# Patient Record
Sex: MALE | Race: WHITE | NOT HISPANIC OR LATINO | Employment: STUDENT | URBAN - METROPOLITAN AREA
[De-identification: names, ages, dates, MRNs, and addresses within clinical notes are randomized per-mention and may not be internally consistent; named-entity substitution may affect disease eponyms.]

---
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
## 2020-09-09 ENCOUNTER — TRANSCRIBE ORDERS (OUTPATIENT)
Dept: ADMINISTRATIVE | Facility: HOSPITAL | Age: 20
End: 2020-09-09

## 2020-09-09 DIAGNOSIS — R68.81 EARLY SATIETY: Primary | ICD-10-CM

## 2020-09-27 ENCOUNTER — HOSPITAL ENCOUNTER (OUTPATIENT)
Dept: NUCLEAR MEDICINE | Facility: HOSPITAL | Age: 20
Discharge: HOME | End: 2020-09-27
Attending: INTERNAL MEDICINE
Payer: COMMERCIAL

## 2020-09-27 DIAGNOSIS — R68.81 EARLY SATIETY: ICD-10-CM

## 2020-09-27 PROCEDURE — A9541 TC99M SULFUR COLLOID: HCPCS

## 2020-09-27 PROCEDURE — G1004 CDSM NDSC: HCPCS

## 2020-09-27 PROCEDURE — 78264 GASTRIC EMPTYING IMG STUDY: CPT

## 2022-03-28 ENCOUNTER — OFFICE VISIT (OUTPATIENT)
Dept: DERMATOLOGY | Facility: CLINIC | Age: 22
End: 2022-03-28
Payer: COMMERCIAL

## 2022-03-28 VITALS — WEIGHT: 184.8 LBS | TEMPERATURE: 96.5 F | BODY MASS INDEX: 23.73 KG/M2

## 2022-03-28 DIAGNOSIS — Z13.89 SCREENING FOR SKIN CONDITION: ICD-10-CM

## 2022-03-28 DIAGNOSIS — D22.9 NEVUS: Primary | ICD-10-CM

## 2022-03-28 PROCEDURE — 99202 OFFICE O/P NEW SF 15 MIN: CPT | Performed by: DERMATOLOGY

## 2022-03-28 NOTE — PATIENT INSTRUCTIONS
Alea reviewed the concept of ABCDE and ugly duckling nothing markedly atypical patient reassured  Screening for skin condition nothing else of concern noted exam follow-up as needed

## 2022-03-28 NOTE — PROGRESS NOTES
[address]  [address] [facility]  [facility] [address]  [phone]  [phone]     MRN: [id number] : 2000  Encounter: [id number]  Patient Information: Davina Conley  Chief complaint:  Nevi    History of present illness:  25-year-old male presents for overall skin check concerned regarding moles was particularly concerned about 1 on his left chin that will that he does not like on the no other concerns noted  Past Medical History:   Diagnosis Date    H  pylori infection      Past Surgical History:   Procedure Laterality Date    TONSILLECTOMY AND ADENOIDECTOMY  2006    WISDOM TOOTH EXTRACTION       Social History   Social History     Substance and Sexual Activity   Alcohol Use No     Social History     Substance and Sexual Activity   Drug Use No     Social History     Tobacco Use   Smoking Status Never Smoker   Smokeless Tobacco Never Used     Family History   Family history unknown: Yes     Meds/Allergies   No Known Allergies    Meds:  Prior to Admission medications    Medication Sig Start Date End Date Taking?  Authorizing Provider   esomeprazole (NexIUM) 20 mg capsule Take 20 mg by mouth every morning before breakfast  Patient not taking: Reported on 3/28/2022     Historical Provider, MD   sucralfate (CARAFATE) 1 g tablet Take 1 tablet (1 g total) by mouth 4 (four) times a day 30 min prior to meals and at bedtime  Patient not taking: Reported on 2020   Jason Arriola MD       Subjective:     Review of Systems:    General: negative for - chills, fatigue, fever,  weight gain or weight loss  Psychological: negative for - anxiety, behavioral disorder, concentration difficulties, decreased libido, depression, irritability, memory difficulties, mood swings, sleep disturbances or suicidal ideation  ENT: negative for - hearing difficulties , nasal congestion, nasal discharge, oral lesions, sinus pain, sneezing, sore throat  Allergy and Immunology: negative for - hives, insect bite sensitivity,  Hematological and Lymphatic: negative for - bleeding problems, blood clots,bruising, swollen lymph nodes  Endocrine: negative for - hair pattern changes, hot flashes, malaise/lethargy, mood swings, palpitations, polydipsia/polyuria, skin changes, temperature intolerance or unexpected weight change  Respiratory: negative for - cough, hemoptysis, orthopnea, shortness of breath, or wheezing  Cardiovascular: negative for - chest pain, dyspnea on exertion, edema,  Gastrointestinal: negative for - abdominal pain, nausea/vomiting  Genito-Urinary: negative for - dysuria, incontinence, irregular/heavy menses or urinary frequency/urgency  Musculoskeletal: negative for - gait disturbance, joint pain, joint stiffness, joint swelling, muscle pain, muscular weakness  Dermatological:  As in HPI  Neurological: negative for confusion, dizziness, headaches, impaired coordination/balance, memory loss, numbness/tingling, seizures, speech problems, tremors or weakness       Objective:   Temp (!) 96 5 °F (35 8 °C) (Temporal)   Wt 83 8 kg (184 lb 12 8 oz)   BMI 23 73 kg/m²     Physical Exam:    General Appearance:    Alert, cooperative, no distress   Head:    Normocephalic, without obvious abnormality, atraumatic           Skin:   A full skin exam was performed including scalp, head scalp, eyes, ears, nose, lips, neck, chest, axilla, abdomen, back, buttocks, bilateral upper extremities, bilateral lower extremities, hands, feet, fingers, toes, fingernails, and toenails numerous pigmented lesions all regular shape and color nothing markedly atypical noted on complete exam 1 on chin is little bit more raised nothing else remarkable     Assessment:     1  Nevus     2   Screening for skin condition           Plan:   Nevi reviewed the concept of ABCDE and ugly duckling nothing markedly atypical patient reassured  Screening for skin condition nothing else of concern noted exam follow-up as needed    Isidra Barba MD  3/28/2022,3:03 PM    Portions of the record may have been created with voice recognition software   Occasional wrong word or "sound a like" substitutions may have occurred due to the inherent limitations of voice recognition software   Read the chart carefully and recognize, using context, where substitutions have occurred

## 2023-02-17 ENCOUNTER — OFFICE VISIT (OUTPATIENT)
Dept: FAMILY MEDICINE CLINIC | Facility: CLINIC | Age: 23
End: 2023-02-17

## 2023-02-17 VITALS
OXYGEN SATURATION: 97 % | SYSTOLIC BLOOD PRESSURE: 138 MMHG | RESPIRATION RATE: 18 BRPM | DIASTOLIC BLOOD PRESSURE: 82 MMHG | TEMPERATURE: 98.9 F | BODY MASS INDEX: 25.93 KG/M2 | HEIGHT: 74 IN | HEART RATE: 55 BPM | WEIGHT: 202 LBS

## 2023-02-17 DIAGNOSIS — Z00.00 ENCOUNTER FOR ROUTINE ADULT HEALTH EXAMINATION WITHOUT ABNORMAL FINDINGS: Primary | ICD-10-CM

## 2023-02-17 NOTE — PROGRESS NOTES
Name: Quincy Kilpatrick      : 2000      MRN: [id number]  Encounter Provider: ZHANNA Ireland  Encounter Date: 2023   Encounter department: [address] [place] [address]    Assessment & Plan     1  Encounter for routine adult health examination without abnormal findings  -     CBC and differential; Future  -     Comprehensive metabolic panel; Future  -     Lipid panel; Future  -     TSH, 3rd generation; Future  -     UA w Reflex to Microscopic w Reflex to Culture           Subjective       Patient is here for routine health physical   To have routine labs  To also discuss current state of health and any new problems that they may be experiencing  Review of Systems   Constitutional: Negative for appetite change and fever  HENT: Negative for congestion and trouble swallowing  Respiratory: Negative for shortness of breath  Cardiovascular: Negative for chest pain  Gastrointestinal: Negative for abdominal pain  Genitourinary: Negative for difficulty urinating  Musculoskeletal: Negative for myalgias  Neurological: Negative for dizziness  Psychiatric/Behavioral: The patient is not nervous/anxious          Past Medical History:   Diagnosis Date   • H  pylori infection      Past Surgical History:   Procedure Laterality Date   • TONSILLECTOMY AND ADENOIDECTOMY     • WISDOM TOOTH EXTRACTION       Family History   Problem Relation Age of Onset   • No Known Problems Mother    • No Known Problems Father      Social History     Socioeconomic History   • Marital status: Single     Spouse name: None   • Number of children: None   • Years of education: None   • Highest education level: None   Occupational History   • None   Tobacco Use   • Smoking status: Never   • Smokeless tobacco: Never   Vaping Use   • Vaping Use: Never used   Substance and Sexual Activity   • Alcohol use: No   • Drug use: No   • Sexual activity: None   Other Topics Concern   • None   Social History Narrative   • None     Social Determinants of Health     Financial Resource Strain: Not on file   Food Insecurity: Not on file   Transportation Needs: Not on file   Physical Activity: Not on file   Stress: Not on file   Social Connections: Not on file   Intimate Partner Violence: Not on file   Housing Stability: Not on file     Current Outpatient Medications on File Prior to Visit   Medication Sig   • esomeprazole (NexIUM) 20 mg capsule Take 20 mg by mouth every morning before breakfast (Patient not taking: Reported on 3/28/2022)   • sucralfate (CARAFATE) 1 g tablet Take 1 tablet (1 g total) by mouth 4 (four) times a day 30 min prior to meals and at bedtime (Patient not taking: Reported on 9/29/2020)     No Known Allergies  Immunization History   Administered Date(s) Administered   • DTaP 01/11/2001, 03/27/2001, 05/21/2001, 09/30/2002, 06/05/2006   • Hep B, Adolescent or Pediatric 08/27/2001, 12/20/2001, 09/30/2002   • HiB 01/11/2001, 03/27/2001, 05/21/2001, 03/20/2002   • IPV 06/05/2006   • MMR 04/16/2002, 06/05/2006   • Meningococcal MCV4P 08/23/2011, 07/27/2018   • OPV 01/11/2001, 03/27/2001, 09/30/2002   • Pneumococcal Conjugate PCV 7 01/11/2001, 03/27/2001, 05/21/2001, 03/20/2002   • Tdap 09/15/2012   • Varicella 04/16/2002, 08/23/2011       Objective     /82 (BP Location: Left arm, Patient Position: Sitting, Cuff Size: Standard)   Pulse 55   Temp 98 9 °F (37 2 °C) (Temporal)   Resp 18   Ht 6' 1 5" (1 867 m)   Wt 91 6 kg (202 lb)   SpO2 97%   BMI 26 29 kg/m²     Physical Exam  Vitals and nursing note reviewed  Constitutional:       General: He is not in acute distress  Appearance: He is well-developed  HENT:      Head: Normocephalic  Right Ear: External ear normal       Left Ear: External ear normal       Mouth/Throat:      Pharynx: Oropharynx is clear  Eyes:      Pupils: Pupils are equal, round, and reactive to light  Cardiovascular:      Rate and Rhythm: Normal rate and regular rhythm  Heart sounds: Normal heart sounds  Pulmonary:      Effort: Pulmonary effort is normal       Breath sounds: Normal breath sounds  Abdominal:      Palpations: Abdomen is soft  Musculoskeletal:         General: Normal range of motion  Cervical back: Normal range of motion  Skin:     General: Skin is warm and dry  Neurological:      Mental Status: He is alert and oriented to person, place, and time  Psychiatric:         Behavior: Behavior normal          Thought Content:  Thought content normal          Judgment: Judgment normal        HZANNA Nguyen

## 2023-03-11 LAB
ALBUMIN SERPL-MCNC: 5 G/DL (ref 3.6–5.1)
ALBUMIN/GLOB SERPL: 1.9 (CALC) (ref 1–2.5)
ALP SERPL-CCNC: 79 U/L (ref 36–130)
ALT SERPL-CCNC: 12 U/L (ref 9–46)
APPEARANCE UR: CLEAR
AST SERPL-CCNC: 15 U/L (ref 10–40)
BACTERIA UR QL AUTO: ABNORMAL /HPF
BASOPHILS # BLD AUTO: 29 CELLS/UL (ref 0–200)
BASOPHILS NFR BLD AUTO: 0.4 %
BILIRUB SERPL-MCNC: 1.3 MG/DL (ref 0.2–1.2)
BILIRUB UR QL STRIP: NEGATIVE
BUN SERPL-MCNC: 16 MG/DL (ref 7–25)
BUN/CREAT SERPL: ABNORMAL (CALC) (ref 6–22)
CALCIUM SERPL-MCNC: 10 MG/DL (ref 8.6–10.3)
CHLORIDE SERPL-SCNC: 103 MMOL/L (ref 98–110)
CHOLEST SERPL-MCNC: 156 MG/DL
CHOLEST/HDLC SERPL: 4 (CALC)
CO2 SERPL-SCNC: 28 MMOL/L (ref 20–32)
COLOR UR: YELLOW
CREAT SERPL-MCNC: 1.14 MG/DL (ref 0.6–1.24)
EOSINOPHIL # BLD AUTO: 80 CELLS/UL (ref 15–500)
EOSINOPHIL NFR BLD AUTO: 1.1 %
ERYTHROCYTE [DISTWIDTH] IN BLOOD BY AUTOMATED COUNT: 11.9 % (ref 11–15)
GFR/BSA.PRED SERPLBLD CYS-BASED-ARV: 93 ML/MIN/1.73M2
GLOBULIN SER CALC-MCNC: 2.6 G/DL (CALC) (ref 1.9–3.7)
GLUCOSE SERPL-MCNC: 82 MG/DL (ref 65–99)
GLUCOSE UR QL STRIP: NEGATIVE
HCT VFR BLD AUTO: 45.2 % (ref 38.5–50)
HDLC SERPL-MCNC: 39 MG/DL
HGB BLD-MCNC: 15.3 G/DL (ref 13.2–17.1)
HGB UR QL STRIP: NEGATIVE
HYALINE CASTS #/AREA URNS LPF: ABNORMAL /LPF
KETONES UR QL STRIP: NEGATIVE
LDLC SERPL CALC-MCNC: 99 MG/DL (CALC)
LEUKOCYTE ESTERASE UR QL STRIP: NEGATIVE
LYMPHOCYTES # BLD AUTO: 3000 CELLS/UL (ref 850–3900)
LYMPHOCYTES NFR BLD AUTO: 41.1 %
MCH RBC QN AUTO: 30.5 PG (ref 27–33)
MCHC RBC AUTO-ENTMCNC: 33.8 G/DL (ref 32–36)
MCV RBC AUTO: 90.2 FL (ref 80–100)
MONOCYTES # BLD AUTO: 694 CELLS/UL (ref 200–950)
MONOCYTES NFR BLD AUTO: 9.5 %
NEUTROPHILS # BLD AUTO: 3497 CELLS/UL (ref 1500–7800)
NEUTROPHILS NFR BLD AUTO: 47.9 %
NITRITE UR QL STRIP: NEGATIVE
NONHDLC SERPL-MCNC: 117 MG/DL (CALC)
PH UR STRIP: 5.5 [PH] (ref 5–8)
PLATELET # BLD AUTO: 298 THOUSAND/UL (ref 140–400)
PMV BLD REES-ECKER: 10.5 FL (ref 7.5–12.5)
POTASSIUM SERPL-SCNC: 4.4 MMOL/L (ref 3.5–5.3)
PROT SERPL-MCNC: 7.6 G/DL (ref 6.1–8.1)
PROT UR QL STRIP: ABNORMAL
RBC # BLD AUTO: 5.01 MILLION/UL (ref 4.2–5.8)
RBC #/AREA URNS HPF: ABNORMAL /HPF
SODIUM SERPL-SCNC: 138 MMOL/L (ref 135–146)
SP GR UR STRIP: 1.03 (ref 1–1.03)
SQUAMOUS #/AREA URNS HPF: ABNORMAL /HPF
TRIGL SERPL-MCNC: 86 MG/DL
TSH SERPL-ACNC: 2.67 MIU/L (ref 0.4–4.5)
WBC # BLD AUTO: 7.3 THOUSAND/UL (ref 3.8–10.8)
WBC #/AREA URNS HPF: ABNORMAL /HPF

## 2023-03-15 ENCOUNTER — OFFICE VISIT (OUTPATIENT)
Dept: FAMILY MEDICINE CLINIC | Facility: CLINIC | Age: 23
End: 2023-03-15

## 2023-03-15 VITALS
HEIGHT: 74 IN | BODY MASS INDEX: 25.15 KG/M2 | OXYGEN SATURATION: 98 % | TEMPERATURE: 98 F | HEART RATE: 67 BPM | DIASTOLIC BLOOD PRESSURE: 70 MMHG | WEIGHT: 196 LBS | SYSTOLIC BLOOD PRESSURE: 112 MMHG | RESPIRATION RATE: 18 BRPM

## 2023-03-15 DIAGNOSIS — Z72.821 POOR SLEEP HYGIENE: ICD-10-CM

## 2023-03-15 DIAGNOSIS — F12.90 MARIJUANA SMOKER: ICD-10-CM

## 2023-03-15 DIAGNOSIS — F41.9 ANXIETY: Primary | ICD-10-CM

## 2023-03-15 RX ORDER — BUSPIRONE HYDROCHLORIDE 7.5 MG/1
7.5 TABLET ORAL 2 TIMES DAILY
Qty: 60 TABLET | Refills: 5 | Status: SHIPPED | OUTPATIENT
Start: 2023-03-15

## 2023-03-15 NOTE — PROGRESS NOTES
Name: Yaimla Morales      : 2000      MRN: [id number]  Encounter Provider: ZHANNA Uribe  Encounter Date: 3/15/2023   Encounter department: [address] [place] [address]    Assessment & Plan     1  Anxiety  -     busPIRone (BUSPAR) 7 5 mg tablet; Take 1 tablet (7 5 mg total) by mouth 2 (two) times a day    2  Marijuana smoker    3  Poor sleep hygiene    Physical assessment unremarkable  Patient does not seem to from depression however anxiety possibly some slight paranoia possibly related to his marijuana use  Patient does state that he smokes to relieve anxiety is interested in stopping smoking and or reducing it significantly but he thinks that he will have a problem  Patient denies any thoughts of harming himself or harming anyone else  Patient also does state that he has very poor sleep hygiene multiple devices area in his room he does not feel he gets a good restful sleep  Did advise that this can also cause an increase in anxiety feelings of tiredness and fatigue  Ways to remediate this were discussed patient verbalized understanding and the desire to do this  Also I did advise that any medication or drug that he does use eventually that stopping cold turkey could render very poor results and outcomes  I did advise I will send BuSpar 7 5 mg to be taken 2 times daily to decrease his anxiety level in the meantime he can decrease his marijuana usage accordingly  Patient verbalized understanding did advise with the start of any new medication of this nature I would like to see him back in the office in 2 weeks for follow-up to determine how he is tolerating the medication and if it is providing the desired outcome  Again he was advised that he does not currently have thoughts of harming himself or anyone else but if he does develop those thoughts he is to contact me in the office immediately  Subjective     Patient is here with a continued complaint of anxiety    Patient states he feels it primarily in the morning upon wakening  It should be noted that patient does smoke marijuana multiple times per day  Has spoken to him in the past about the use of this med of drug and that it can cause some anxiety paranoia and various other physical symptoms  Patient states that he is ready to decrease the amount he does smoke but feels he would need something in place of it as he feels it would be to how much anxiety for him  He denies any other related symptoms  Review of Systems   Constitutional: Negative for appetite change and fever  HENT: Negative for congestion and trouble swallowing  Respiratory: Negative for shortness of breath  Cardiovascular: Negative for chest pain  Gastrointestinal: Negative for abdominal pain  Genitourinary: Negative for difficulty urinating  Musculoskeletal: Negative for myalgias  Neurological: Negative for dizziness  Psychiatric/Behavioral: The patient is nervous/anxious          Past Medical History:   Diagnosis Date   • H  pylori infection      Past Surgical History:   Procedure Laterality Date   • TONSILLECTOMY AND ADENOIDECTOMY  2006   • WISDOM TOOTH EXTRACTION       Family History   Problem Relation Age of Onset   • No Known Problems Mother    • No Known Problems Father      Social History     Socioeconomic History   • Marital status: Single     Spouse name: None   • Number of children: None   • Years of education: None   • Highest education level: None   Occupational History   • None   Tobacco Use   • Smoking status: Never   • Smokeless tobacco: Never   Vaping Use   • Vaping Use: Never used   Substance and Sexual Activity   • Alcohol use: No   • Drug use: No   • Sexual activity: None   Other Topics Concern   • None   Social History Narrative   • None     Social Determinants of Health     Financial Resource Strain: Not on file   Food Insecurity: Not on file   Transportation Needs: Not on file   Physical Activity: Not on file   Stress: Not on file   Social Connections: Not on file   Intimate Partner Violence: Not on file   Housing Stability: Not on file     Current Outpatient Medications on File Prior to Visit   Medication Sig   • esomeprazole (NexIUM) 20 mg capsule Take 20 mg by mouth every morning before breakfast (Patient not taking: Reported on 3/28/2022)   • sucralfate (CARAFATE) 1 g tablet Take 1 tablet (1 g total) by mouth 4 (four) times a day 30 min prior to meals and at bedtime (Patient not taking: Reported on 9/29/2020)     No Known Allergies  Immunization History   Administered Date(s) Administered   • DTaP 01/11/2001, 03/27/2001, 05/21/2001, 09/30/2002, 06/05/2006   • Hep B, Adolescent or Pediatric 08/27/2001, 12/20/2001, 09/30/2002   • HiB 01/11/2001, 03/27/2001, 05/21/2001, 03/20/2002   • IPV 06/05/2006   • MMR 04/16/2002, 06/05/2006   • Meningococcal MCV4P 08/23/2011, 07/27/2018   • OPV 01/11/2001, 03/27/2001, 09/30/2002   • Pneumococcal Conjugate PCV 7 01/11/2001, 03/27/2001, 05/21/2001, 03/20/2002   • Tdap 09/15/2012   • Varicella 04/16/2002, 08/23/2011       Objective     /70 (BP Location: Left arm, Patient Position: Sitting, Cuff Size: Large)   Pulse 67   Temp 98 °F (36 7 °C) (Temporal)   Resp 18   Ht 6' 1 5" (1 867 m)   Wt 88 9 kg (196 lb)   SpO2 98%   BMI 25 51 kg/m²     Physical Exam  Cardiovascular:      Rate and Rhythm: Regular rhythm  Heart sounds: Normal heart sounds  Pulmonary:      Effort: Pulmonary effort is normal    Neurological:      General: No focal deficit present  Mental Status: He is alert and oriented to person, place, and time     Psychiatric:         Mood and Affect: Mood normal          Behavior: Behavior normal        ZHANNA Santizo

## 2023-03-29 ENCOUNTER — OFFICE VISIT (OUTPATIENT)
Dept: FAMILY MEDICINE CLINIC | Facility: CLINIC | Age: 23
End: 2023-03-29

## 2023-03-29 VITALS
DIASTOLIC BLOOD PRESSURE: 70 MMHG | HEART RATE: 66 BPM | OXYGEN SATURATION: 97 % | SYSTOLIC BLOOD PRESSURE: 114 MMHG | HEIGHT: 74 IN | RESPIRATION RATE: 18 BRPM | WEIGHT: 193 LBS | BODY MASS INDEX: 24.77 KG/M2 | TEMPERATURE: 98.8 F

## 2023-03-29 DIAGNOSIS — F41.9 ANXIETY: Primary | ICD-10-CM

## 2023-03-29 NOTE — PROGRESS NOTES
"Name: Imelda Lindsay      : 2000      MRN: [id number]  Encounter Provider: ZHANNA Hickman  Encounter Date: 3/29/2023   Encounter department: [address] [place] [address]    Assessment & Plan     1  Anxiety    Physical assessment is unremarkable  Initial conversation patient is not sure if this is causing or giving him less anxiety  But and after discussion patient has realized that his sleep cycle has improved his better quality of sleep making better food choices has more interest in being out in about and also in his personal appearance  Patient states that his parents have noticed that he has better hygiene and is taking better care of himself physically no dark circles under his eyes looks well rested  Patient was informed of this and stated \"I think you might be right maybe it is helping\"  I did inform patient that this is a 2-week starting point and that he should feel significant improvement as the weeks past   Patient was also informed that if he gets a stagnant point that he can contact me by phone to discuss a possible increase in the dosage  Patient states he feels like this dosage is going to work well for him instructions were given how to refill medication he was also advised any changes in his mood or behavior he is to contact me back in the office immediately  He is happy with the outcome of today's visit  He would like to see me back in the office in 3 months  Dosing all possible side effects of the prescribed medications or medications that had been prescribed in the past were reviewed and all questions were answered  Patient verbalized agreement and understanding of the plan of care as outlined during the office visit today return to office as indicated or sooner if a problem arises  Subjective      Patient here for a 2-week follow-up after starting a new medication for anxiety BuSpar  He has taken it as prescribed    Patient states he is not sure if he " "is feeling less anxiety  But he has changed his sleep cycle he has also found the energy to work out  He feels he is not eating as much but making better food choices as well  He denies any thoughts of harming himself or harming anyone else  Review of Systems   Constitutional: Negative for appetite change and fever  HENT: Negative for congestion and trouble swallowing  Respiratory: Negative for shortness of breath  Cardiovascular: Negative for chest pain  Gastrointestinal: Negative for abdominal pain  Genitourinary: Negative for difficulty urinating  Musculoskeletal: Negative for myalgias  Neurological: Negative for dizziness  Psychiatric/Behavioral: The patient is nervous/anxious  Current Outpatient Medications on File Prior to Visit   Medication Sig   • busPIRone (BUSPAR) 7 5 mg tablet Take 1 tablet (7 5 mg total) by mouth 2 (two) times a day   • esomeprazole (NexIUM) 20 mg capsule Take 20 mg by mouth every morning before breakfast (Patient not taking: Reported on 3/28/2022)   • sucralfate (CARAFATE) 1 g tablet Take 1 tablet (1 g total) by mouth 4 (four) times a day 30 min prior to meals and at bedtime (Patient not taking: Reported on 9/29/2020)       Objective     /70 (BP Location: Left arm, Patient Position: Sitting, Cuff Size: Large)   Pulse 66   Temp 98 8 °F (37 1 °C) (Temporal)   Resp 18   Ht 6' 1 5\" (1 867 m)   Wt 87 5 kg (193 lb)   SpO2 97%   BMI 25 12 kg/m²     Physical Exam  Cardiovascular:      Rate and Rhythm: Regular rhythm  Heart sounds: Normal heart sounds  Pulmonary:      Breath sounds: Normal breath sounds  Neurological:      General: No focal deficit present  Mental Status: He is alert and oriented to person, place, and time  Psychiatric:         Behavior: Behavior normal          Thought Content:  Thought content normal          Judgment: Judgment normal       Comments: Patient appears to have become more rested calmer no dark circles " under his eyes         ZHANNA Arciniega

## 2023-06-29 ENCOUNTER — OFFICE VISIT (OUTPATIENT)
Dept: FAMILY MEDICINE CLINIC | Facility: CLINIC | Age: 23
End: 2023-06-29
Payer: COMMERCIAL

## 2023-06-29 VITALS
DIASTOLIC BLOOD PRESSURE: 80 MMHG | HEIGHT: 74 IN | WEIGHT: 200 LBS | BODY MASS INDEX: 25.67 KG/M2 | OXYGEN SATURATION: 97 % | HEART RATE: 54 BPM | TEMPERATURE: 98.1 F | SYSTOLIC BLOOD PRESSURE: 120 MMHG | RESPIRATION RATE: 18 BRPM

## 2023-06-29 DIAGNOSIS — F41.9 ANXIETY: ICD-10-CM

## 2023-06-29 PROCEDURE — 99213 OFFICE O/P EST LOW 20 MIN: CPT | Performed by: NURSE PRACTITIONER

## 2023-06-29 RX ORDER — BUSPIRONE HYDROCHLORIDE 7.5 MG/1
7.5 TABLET ORAL 2 TIMES DAILY
Qty: 60 TABLET | Refills: 5 | Status: SHIPPED | OUTPATIENT
Start: 2023-06-29

## 2023-06-29 NOTE — PROGRESS NOTES
Name: Dorisann Hodgkins      : 2000      MRN: [id number]  Encounter Provider: ZHANNA Moran  Encounter Date: 2023   Encounter department: [address] [place] [address]    Assessment & Plan     1  Anxiety  -     busPIRone (BUSPAR) 7 5 mg tablet; Take 1 tablet (7 5 mg total) by mouth 2 (two) times a day    Patient is here for follow-up  Patient states that he feels the BuSpar does a good job for him  He still struggling with using marijuana at this time but was definitely interested in stopping did advise if he has any questions or feels that he needs an increase in the BuSpar after stopping marijuana that he should contact me in the office will more than happy to do that  Also advised that illegal drug use is never a good idea  All of the questions answered he is happy with the outcome of today's visit like to see him back in the office in 6 months or sooner should he encounter any additional problems  Dosing all possible side effects of the prescribed medications or medications that had been prescribed in the past were reviewed and all questions were answered  Patient verbalized agreement and understanding of the plan of care as outlined during the office visit today return to office as indicated or sooner if a problem arises  Subjective        Patient is here for routine follow-up  To also discuss current state of health and any new problems that they may be experiencing  Patient states that medications taken as prescribed and very well tolerated no new complaints at this time  Review of Systems   Constitutional: Negative for appetite change and fever  HENT: Negative for congestion and trouble swallowing  Respiratory: Negative for shortness of breath  Cardiovascular: Negative for chest pain  Gastrointestinal: Negative for abdominal pain  Genitourinary: Negative for difficulty urinating  Musculoskeletal: Negative for myalgias     Neurological: Negative for "dizziness  Psychiatric/Behavioral: The patient is not nervous/anxious  Current Outpatient Medications on File Prior to Visit   Medication Sig   • [DISCONTINUED] busPIRone (BUSPAR) 7 5 mg tablet Take 1 tablet (7 5 mg total) by mouth 2 (two) times a day   • esomeprazole (NexIUM) 20 mg capsule Take 20 mg by mouth every morning before breakfast (Patient not taking: Reported on 3/28/2022)   • sucralfate (CARAFATE) 1 g tablet Take 1 tablet (1 g total) by mouth 4 (four) times a day 30 min prior to meals and at bedtime (Patient not taking: Reported on 9/29/2020)   • triamcinolone (KENALOG) 0 025 % cream Apply topically daily at bedtime as needed for irritation or rash (Patient not taking: Reported on 6/29/2023)       Objective     /80 (BP Location: Left arm, Patient Position: Sitting, Cuff Size: Large)   Pulse (!) 54   Temp 98 1 °F (36 7 °C) (Temporal)   Resp 18   Ht 6' 1 5\" (1 867 m)   Wt 90 7 kg (200 lb)   SpO2 97%   BMI 26 03 kg/m²     Physical Exam  Neurological:      General: No focal deficit present  Psychiatric:         Mood and Affect: Mood normal          Behavior: Behavior normal          Thought Content:  Thought content normal          Judgment: Judgment normal        ZHANNA Cantu  "

## 2023-09-14 ENCOUNTER — TELEPHONE (OUTPATIENT)
Age: 23
End: 2023-09-14

## 2023-09-14 NOTE — TELEPHONE ENCOUNTER
Yes if symptoms have reoccurred that he should definitely restart the medication. This is not a medication that can be taken on an as-needed basis has to be taken daily. If he has any questions concerning this we can have a follow-up visit in the office.

## 2023-09-14 NOTE — TELEPHONE ENCOUNTER
Call from patients mom advising the patient asked her to call and get some advice. Patient discussed coming off of his Buspar medication last time he was in the office. He started a new job and forgot to take the medication for 3 days and then just continued not to take it because he felt fine. Patient woke up this morning feeling anxious. Should he start to take the medication again? Please return her call.

## 2023-10-10 DIAGNOSIS — F41.9 ANXIETY: ICD-10-CM

## 2023-10-10 RX ORDER — BUSPIRONE HYDROCHLORIDE 7.5 MG/1
7.5 TABLET ORAL 2 TIMES DAILY
Qty: 60 TABLET | Refills: 5 | Status: CANCELLED | OUTPATIENT
Start: 2023-10-10

## 2023-10-10 NOTE — TELEPHONE ENCOUNTER
Renu Herrera from [facility] requested a 90 day supply of the Buspirone for patient   and needs instructions for usage. If you don't want to call it in you can escribe it. [facility] address is   [address] [place] [address], [address] [place] [address], [address] or fax [phone]        If you have questions please contact Renu Herrera  at [phone]      Thank you.

## 2023-10-11 DIAGNOSIS — F41.9 ANXIETY: Primary | ICD-10-CM

## 2023-10-11 RX ORDER — BUSPIRONE HYDROCHLORIDE 7.5 MG/1
7.5 TABLET ORAL 2 TIMES DAILY
Qty: 180 TABLET | Refills: 1 | Status: SHIPPED | OUTPATIENT
Start: 2023-10-11

## 2023-12-12 ENCOUNTER — OFFICE VISIT (OUTPATIENT)
Dept: FAMILY MEDICINE CLINIC | Facility: CLINIC | Age: 23
End: 2023-12-12
Payer: COMMERCIAL

## 2023-12-12 VITALS
RESPIRATION RATE: 16 BRPM | DIASTOLIC BLOOD PRESSURE: 82 MMHG | HEIGHT: 74 IN | HEART RATE: 78 BPM | TEMPERATURE: 98.2 F | BODY MASS INDEX: 24.77 KG/M2 | SYSTOLIC BLOOD PRESSURE: 120 MMHG | WEIGHT: 193 LBS | OXYGEN SATURATION: 96 %

## 2023-12-12 DIAGNOSIS — F41.9 ANXIETY: ICD-10-CM

## 2023-12-12 DIAGNOSIS — J06.9 URI WITH COUGH AND CONGESTION: Primary | ICD-10-CM

## 2023-12-12 PROCEDURE — 99214 OFFICE O/P EST MOD 30 MIN: CPT | Performed by: NURSE PRACTITIONER

## 2023-12-12 RX ORDER — AZITHROMYCIN 250 MG/1
TABLET, FILM COATED ORAL
Qty: 6 TABLET | Refills: 0 | Status: SHIPPED | OUTPATIENT
Start: 2023-12-12 | End: 2023-12-16

## 2023-12-12 RX ORDER — BUSPIRONE HYDROCHLORIDE 10 MG/1
10 TABLET ORAL 2 TIMES DAILY
Qty: 60 TABLET | Refills: 1 | Status: SHIPPED | OUTPATIENT
Start: 2023-12-12

## 2023-12-12 RX ORDER — KETOCONAZOLE 20 MG/G
CREAM TOPICAL
COMMUNITY
Start: 2023-11-01

## 2023-12-12 RX ORDER — PREDNISONE 10 MG/1
10 TABLET ORAL 2 TIMES DAILY WITH MEALS
Qty: 6 TABLET | Refills: 0 | Status: SHIPPED | OUTPATIENT
Start: 2023-12-12 | End: 2023-12-15

## 2023-12-12 NOTE — PROGRESS NOTES
Assessment/Plan:      Diagnoses and all orders for this visit:    URI with cough and congestion  -     predniSONE 10 mg tablet; Take 1 tablet (10 mg total) by mouth 2 (two) times a day with meals for 3 days  -     azithromycin (Zithromax) 250 mg tablet; Take 2 tablets (500 mg total) by mouth daily for 1 day, THEN 1 tablet (250 mg total) daily for 4 days. Is a new problem. Uncomplicated upper respiratory did advise we will send azithromycin and low-dose prednisone for 3 days to the pharmacy if fails to improve or significantly worsens he is to contact me back in the office. Dosing all possible side effects of the prescribed medications or medications that had been prescribed in the past were reviewed and all questions were answered. Patient verbalized agreement and understanding of the plan of care as outlined during the office visit today return to office as indicated or sooner if a problem arises. Anxiety  -     busPIRone (BUSPAR) 10 mg tablet; Take 1 tablet (10 mg total) by mouth 2 (two) times a day  Stable. I did advise we will increase the BuSpar to 10 mg twice daily is contacted back in the office if this level is working better for him I will increase the number that he is getting from the pharmacy. All of the questions were answered no thoughts of harming himself or harming anyone else. Subjective:     Patient ID: Ingrid Estes is a 21 y.o. male. Patient is here with a complaint of upper respiratory tract discomfort has had a cough runny nose and some nasal congestion. Denies any need nausea vomiting diarrhea chest pains or shortness of breath. All over-the-counter medication attempted arm were unsuccessful. Review of Systems   Constitutional:  Positive for fatigue. HENT:  Positive for congestion and postnasal drip. Respiratory:  Positive for cough and wheezing. Psychiatric/Behavioral:  The patient is nervous/anxious.           Objective:     Physical Exam  Vitals and nursing note reviewed. Constitutional:       General: He is not in acute distress. Appearance: He is well-developed. HENT:      Head: Normocephalic. Right Ear: External ear normal.      Left Ear: External ear normal.      Mouth/Throat:      Pharynx: Oropharynx is clear. Eyes:      Pupils: Pupils are equal, round, and reactive to light. Cardiovascular:      Rate and Rhythm: Normal rate and regular rhythm. Heart sounds: Normal heart sounds. Pulmonary:      Effort: Pulmonary effort is normal.      Breath sounds: Normal breath sounds. Abdominal:      Palpations: Abdomen is soft. Musculoskeletal:         General: Normal range of motion. Cervical back: Normal range of motion. Skin:     General: Skin is warm and dry. Neurological:      Mental Status: He is alert and oriented to person, place, and time. Psychiatric:         Behavior: Behavior normal.         Thought Content:  Thought content normal.         Judgment: Judgment normal.

## 2024-01-31 ENCOUNTER — OFFICE VISIT (OUTPATIENT)
Dept: FAMILY MEDICINE CLINIC | Facility: CLINIC | Age: 24
End: 2024-01-31
Payer: COMMERCIAL

## 2024-01-31 VITALS
DIASTOLIC BLOOD PRESSURE: 88 MMHG | TEMPERATURE: 98.8 F | BODY MASS INDEX: 25.54 KG/M2 | HEIGHT: 74 IN | HEART RATE: 77 BPM | SYSTOLIC BLOOD PRESSURE: 126 MMHG | RESPIRATION RATE: 16 BRPM | OXYGEN SATURATION: 98 % | WEIGHT: 199 LBS

## 2024-01-31 DIAGNOSIS — F41.8 ANXIETY WITH DEPRESSION: Primary | ICD-10-CM

## 2024-01-31 DIAGNOSIS — Z86.19 HISTORY OF COLD SORES: ICD-10-CM

## 2024-01-31 PROCEDURE — 99214 OFFICE O/P EST MOD 30 MIN: CPT | Performed by: NURSE PRACTITIONER

## 2024-01-31 RX ORDER — BUSPIRONE HYDROCHLORIDE 10 MG/1
10 TABLET ORAL 2 TIMES DAILY
Qty: 60 TABLET | Refills: 5 | Status: SHIPPED | OUTPATIENT
Start: 2024-01-31

## 2024-01-31 RX ORDER — VALACYCLOVIR HYDROCHLORIDE 500 MG/1
500 TABLET, FILM COATED ORAL 3 TIMES DAILY PRN
Qty: 30 TABLET | Refills: 2 | Status: SHIPPED | OUTPATIENT
Start: 2024-01-31 | End: 2024-02-03

## 2024-01-31 NOTE — PROGRESS NOTES
Assessment/Plan:      Diagnoses and all orders for this visit:    Anxiety with depression  -     busPIRone (BUSPAR) 10 mg tablet; Take 1 tablet (10 mg total) by mouth 2 (two) times a day  Tolerating the BuSpar up very well.  Anxiety and depression is very stable we will continue at current dose.  States it does make him slightly sleepy in the morning did advise setting alarm and taken for arising from bed otherwise continue medication as prescribed  History of cold sores  -     valACYclovir (Valtrex) 500 mg tablet; Take 1 tablet (500 mg total) by mouth 3 (three) times a day as needed (cold sore outbreak) for up to 3 days  Is a new problem.  Intermittent cold sores did advise Valtrex 500 mg 3 times daily when he feels a flare tingling also sun exposure: Abrasion.  Otherwise return to office as needed or as indicated.      Dosing all possible side effects of the prescribed medications or medications that had been prescribed in the past were reviewed and all questions were answered.  Patient verbalized agreement and understanding of the plan of care as outlined during the office visit today return to office as indicated or sooner if a problem arises.    Subjective:     Patient ID: Delmer Delarosa is a 23 y.o. male.      Patient is here for routine follow-up.  To review most recent labs.  To also discuss current state of health and any new problems that they may be experiencing.  Patient states that medications taken as prescribed and very well tolerated no new complaints at this time.            Review of Systems   Constitutional:  Negative for appetite change and fever.   HENT:  Negative for congestion and trouble swallowing.    Respiratory:  Negative for shortness of breath.    Cardiovascular:  Negative for chest pain.   Gastrointestinal:  Negative for abdominal pain.   Genitourinary:  Negative for difficulty urinating.   Musculoskeletal:  Negative for myalgias.   Neurological:  Negative for dizziness.    Psychiatric/Behavioral:  The patient is not nervous/anxious.          Objective:     Physical Exam  Constitutional:       Appearance: Normal appearance.   Cardiovascular:      Rate and Rhythm: Normal rate and regular rhythm.      Heart sounds: Normal heart sounds.   Pulmonary:      Effort: Pulmonary effort is normal.      Breath sounds: Normal breath sounds.   Neurological:      General: No focal deficit present.      Mental Status: He is alert and oriented to person, place, and time.

## 2024-02-01 ENCOUNTER — TELEMEDICINE (OUTPATIENT)
Dept: FAMILY MEDICINE CLINIC | Facility: CLINIC | Age: 24
End: 2024-02-01
Payer: COMMERCIAL

## 2024-02-01 DIAGNOSIS — J01.10 SUBACUTE FRONTAL SINUSITIS: Primary | ICD-10-CM

## 2024-02-01 PROCEDURE — 99441 PR PHYS/QHP TELEPHONE EVALUATION 5-10 MIN: CPT | Performed by: NURSE PRACTITIONER

## 2024-02-01 RX ORDER — AZITHROMYCIN 250 MG/1
TABLET, FILM COATED ORAL DAILY
Qty: 6 TABLET | Refills: 0 | Status: SHIPPED | OUTPATIENT
Start: 2024-02-01 | End: 2024-02-05

## 2024-02-01 NOTE — PROGRESS NOTES
Virtual Brief Visit    This Visit is being completed by telephone. The Patient is located at Home and in the following state in which I hold an active license [place]    The patient was identified by name and date of birth. Delmer Delarosa was informed that this is a telemedicine visit and that the visit is being conducted through Telephone.  My office door was closed. No one else was in the room.  He acknowledged consent and understanding of privacy and security of the video platform. The patient has agreed to participate and understands they can discontinue the visit at any time.    Patient is aware this is a billable service.       Assessment/Plan:    Problem List Items Addressed This Visit    None  Visit Diagnoses       Subacute frontal sinusitis    -  Primary    Relevant Medications    azithromycin (Zithromax) 250 mg tablet            Recent Visits  Date Type Provider Dept   01/31/24 Office Visit ZHANNA Barriga [facility]   Showing recent visits within past 7 days and meeting all other requirements  Future Appointments  No visits were found meeting these conditions.  Showing future appointments within next 150 days and meeting all other requirements         Visit Time  Total Visit Duration: 8 minutes

## 2024-03-26 ENCOUNTER — APPOINTMENT (OUTPATIENT)
Dept: URGENT CARE | Facility: MEDICAL CENTER | Age: 24
End: 2024-03-26

## 2024-06-07 ENCOUNTER — APPOINTMENT (OUTPATIENT)
Dept: URGENT CARE | Facility: MEDICAL CENTER | Age: 24
End: 2024-06-07
Payer: OTHER MISCELLANEOUS

## 2024-06-07 PROCEDURE — 99284 EMERGENCY DEPT VISIT MOD MDM: CPT | Performed by: PHYSICIAN ASSISTANT

## 2024-06-07 PROCEDURE — 90471 IMMUNIZATION ADMIN: CPT | Performed by: PHYSICIAN ASSISTANT

## 2024-06-07 PROCEDURE — 90715 TDAP VACCINE 7 YRS/> IM: CPT

## 2024-06-07 PROCEDURE — G0383 LEV 4 HOSP TYPE B ED VISIT: HCPCS | Performed by: PHYSICIAN ASSISTANT

## 2025-01-21 ENCOUNTER — TELEMEDICINE (OUTPATIENT)
Dept: FAMILY MEDICINE CLINIC | Facility: CLINIC | Age: 25
End: 2025-01-21
Payer: COMMERCIAL

## 2025-01-21 DIAGNOSIS — H00.012 HORDEOLUM EXTERNUM OF RIGHT LOWER EYELID: Primary | ICD-10-CM

## 2025-01-21 PROCEDURE — 99212 OFFICE O/P EST SF 10 MIN: CPT | Performed by: NURSE PRACTITIONER

## 2025-01-21 RX ORDER — ERYTHROMYCIN 5 MG/G
OINTMENT OPHTHALMIC
Qty: 3.5 G | Refills: 0 | Status: SHIPPED | OUTPATIENT
Start: 2025-01-21

## 2025-01-21 NOTE — PROGRESS NOTES
Virtual Regular Visit  Name: Delmer Delarosa      : 2000      MRN: [id number]  Encounter Provider: ZHANNA Dangelo  Encounter Date: 2025   Encounter department: [facility]      Verification of patient location:  Patient is located at Home in the following state in which I hold an active license [place] :  Assessment & Plan  Hordeolum externum of right lower eyelid    Orders:    erythromycin (ILOTYCIN) ophthalmic ointment; Administer 0.5 inches into the right eye daily at bedtime until resolved        Encounter provider ZHANNA Dangelo    The patient was identified by name and date of birth. Delmer Delarosa was informed that this is a telemedicine visit and that the visit is being conducted through the Epic Embedded platform. He agrees to proceed..  My office door was closed. No one else was in the room.  He acknowledged consent and understanding of privacy and security of the video platform. The patient has agreed to participate and understands they can discontinue the visit at any time.    Patient is aware this is a billable service.     History of Present Illness     Right eye discomfort feels like he might have a stye in the corner of his eye.  Denies any change in vision.    Review of Systems   Eyes:  Positive for redness and itching.       Objective   There were no vitals taken for this visit.    Physical Exam  Eyes:      General:         Right eye: Discharge present.       Visit Time  Total Visit Duration: 10 min

## 2025-03-26 ENCOUNTER — TELEMEDICINE (OUTPATIENT)
Dept: FAMILY MEDICINE CLINIC | Facility: CLINIC | Age: 25
End: 2025-03-26
Payer: COMMERCIAL

## 2025-03-26 DIAGNOSIS — Z72.51 HIGH RISK HETEROSEXUAL BEHAVIOR: Primary | ICD-10-CM

## 2025-03-26 PROCEDURE — 98012 SYNCH AUDIO-ONLY EST SF 10: CPT | Performed by: NURSE PRACTITIONER

## 2025-03-26 NOTE — PROGRESS NOTES
Virtual Brief Visit  Name: Delmer Delarosa      : 2000      MRN: [id number]  Encounter Provider: ZHANNA Dangelo  Encounter Date: 3/26/2025   Encounter department: [facility]  :  Assessment & Plan  High risk heterosexual behavior       Patient states recent girlfriend sexual encounter.  Knows the person person was tested and and was found to be positive for Ureaplasma will let patient know that that is a normal part of the vaginal ata routinely does not cause a problem is very rarely considered an STD is only really harmful if the patient is pregnant or if he is symptomatic with a urinary tract infection.      History of Present Illness   HPI    Administrative Statements   Encounter provider ZHANNA Dangelo    The Patient is located at Home and in the following state in which I [place] an active license [place].    The patient was identified by name and date of birth. Delmer Delarosa was informed that this is a telemedicine visit and that the visit is being conducted through Telephone.  My office door was closed. No one else was in the room.  He acknowledged consent and understanding of privacy and security of the video platform. The patient has agreed to participate and understands they can discontinue the visit at any time.    I have spent a total time of 10 minutes in caring for this patient on the day of the visit/encounter including Risk factor reductions, not including the time spent for establishing the audio/video connection.

## 2025-05-14 DIAGNOSIS — Z86.19 HISTORY OF COLD SORES: ICD-10-CM

## 2025-05-14 RX ORDER — VALACYCLOVIR HYDROCHLORIDE 500 MG/1
500 TABLET, FILM COATED ORAL 3 TIMES DAILY PRN
Qty: 30 TABLET | Refills: 2 | Status: SHIPPED | OUTPATIENT
Start: 2025-05-14 | End: 2025-05-17

## 2025-05-14 NOTE — TELEPHONE ENCOUNTER
Patient is scheduled for a virtual appointment 5-. Would like to have Valacyclovir refill sent to [facility]

## 2025-05-15 ENCOUNTER — TELEMEDICINE (OUTPATIENT)
Dept: FAMILY MEDICINE CLINIC | Facility: CLINIC | Age: 25
End: 2025-05-15
Payer: COMMERCIAL

## 2025-05-15 DIAGNOSIS — B35.6 JOCK ITCH: Primary | ICD-10-CM

## 2025-05-15 PROCEDURE — 98016 BRIEF COMUNICAJ TECH-BSD SVC: CPT | Performed by: NURSE PRACTITIONER

## 2025-05-15 RX ORDER — CLOTRIMAZOLE AND BETAMETHASONE DIPROPIONATE 10; .64 MG/G; MG/G
CREAM TOPICAL DAILY PRN
Qty: 45 G | Refills: 1 | Status: SHIPPED | OUTPATIENT
Start: 2025-05-15

## 2025-05-15 NOTE — PROGRESS NOTES
Virtual Brief Visit  Name: Delmer Delarosa      : 2000      MRN: [id number]  Encounter Provider: ZHANNA Dangelo  Encounter Date: 5/15/2025   Encounter department: [facility]  :  Assessment & Plan  Jock itch    Orders:    clotrimazole-betamethasone (LOTRISONE) 1-0.05 % cream; Apply topically daily as needed (jock itch rash)        History of Present Illness   HPI    Administrative Statements   Encounter provider ZHANNA Dangelo    The Patient is located at Home and in the following state in [place] an [place] license [place].    The patient was identified by name and date of birth. Delmer Delarosa was informed that this is a telemedicine visit and that the visit is being conducted through Telephone.  My office door was closed. No one else was in the room.  He acknowledged consent and understanding of privacy and security of the video platform. The patient has agreed to participate and understands they can discontinue the visit at any time.    I have spent a total time of 10 minutes in caring for this patient on the day of the visit/encounter including Prognosis, Instructions for management, Importance of tx compliance, and Risk factor reductions, not including the time spent for establishing the audio/video connection.

## 2025-07-11 ENCOUNTER — OFFICE VISIT (OUTPATIENT)
Dept: FAMILY MEDICINE CLINIC | Facility: CLINIC | Age: 25
End: 2025-07-11
Payer: COMMERCIAL

## 2025-07-11 VITALS
BODY MASS INDEX: 26.18 KG/M2 | WEIGHT: 204 LBS | SYSTOLIC BLOOD PRESSURE: 132 MMHG | HEART RATE: 81 BPM | OXYGEN SATURATION: 98 % | TEMPERATURE: 98.2 F | DIASTOLIC BLOOD PRESSURE: 84 MMHG | RESPIRATION RATE: 16 BRPM | HEIGHT: 74 IN

## 2025-07-11 DIAGNOSIS — Z86.39 HISTORY OF VITAMIN D DEFICIENCY: ICD-10-CM

## 2025-07-11 DIAGNOSIS — R53.83 OTHER FATIGUE: ICD-10-CM

## 2025-07-11 DIAGNOSIS — Z00.00 ROUTINE ADULT HEALTH MAINTENANCE: Primary | ICD-10-CM

## 2025-07-11 PROCEDURE — 99395 PREV VISIT EST AGE 18-39: CPT | Performed by: NURSE PRACTITIONER

## 2025-07-11 NOTE — PROGRESS NOTES
Name: Delmer Delarosa      : 2000      MRN: [id number]  Encounter Provider: ZHANNA Dangelo  Encounter Date: 2025   Encounter department: [facility]    Assessment & Plan  Routine adult health maintenance    Orders:    CBC and differential; Future    Comprehensive metabolic panel; Future    Magnesium; Future    TSH + Free T4; Future    Other fatigue    Orders:    Testosterone, free, total; Future    History of vitamin D deficiency    Orders:    Vitamin D 25 hydroxy; Future       Physical assessment unremarkable. VS were well controlled. Labs given is to complete for possible fu discussion. RTO as needed or for next scheduled appt. All questions answered.    History of Present Illness       Patient is here for routine follow-up.  To review most recent labs.  To also discuss current state of health and any new problems that they may be experiencing.  Patient states that medications taken as prescribed and very well tolerated no new complaints at this time.          Review of Systems   Constitutional:  Negative for appetite change and fever.   HENT:  Negative for congestion and trouble swallowing.    Respiratory:  Negative for shortness of breath.    Cardiovascular:  Negative for chest pain.   Gastrointestinal:  Negative for abdominal pain.   Genitourinary:  Negative for difficulty urinating.   Musculoskeletal:  Negative for myalgias.   Neurological:  Negative for dizziness.   Psychiatric/Behavioral:  The patient is not nervous/anxious.      Past Medical History[1]  Past Surgical History[2]  Family History[3]  Social History[4]  Medications[5]  No Known Allergies  Immunization History   Administered Date(s) Administered    DTaP 2001, 2001, 2001, 2002, 2006    Hep B, Adolescent or Pediatric 2001, 2001, 2002    HiB 2001, 2001, 2001, 2002    IPV 2006    MMR 2002, 2006    Meningococcal MCV4,  "Unspecified 08/23/2011, 07/27/2018    Meningococcal MCV4P 08/23/2011, 07/27/2018    OPV 01/11/2001, 03/27/2001, 09/30/2002    Pneumococcal Conjugate PCV 7 01/11/2001, 03/27/2001, 05/21/2001, 03/20/2002    Tdap 09/15/2012    Varicella 04/16/2002, 08/23/2011     Objective   /84 (BP Location: Left arm, Patient Position: Sitting, Cuff Size: Large)   Pulse 81   Temp 98.2 °F (36.8 °C) (Temporal)   Resp 16   Ht 6' 1.5\" (1.867 m)   Wt 92.5 kg (204 lb)   SpO2 98%   BMI 26.55 kg/m²     Physical Exam  Constitutional:       Appearance: Normal appearance.   HENT:      Head: Normocephalic.      Right Ear: Tympanic membrane and external ear normal.      Left Ear: Tympanic membrane and external ear normal.      Nose: Nose normal.      Mouth/Throat:      Mouth: Mucous membranes are moist.      Pharynx: Oropharynx is clear.     Eyes:      Pupils: Pupils are equal, round, and reactive to light.       Cardiovascular:      Rate and Rhythm: Normal rate and regular rhythm.      Pulses: Normal pulses.      Heart sounds: Normal heart sounds.   Pulmonary:      Effort: Pulmonary effort is normal.      Breath sounds: Normal breath sounds.   Abdominal:      General: Bowel sounds are normal.      Palpations: Abdomen is soft.     Musculoskeletal:         General: Normal range of motion.      Cervical back: Neck supple.     Neurological:      General: No focal deficit present.      Mental Status: He is alert and oriented to person, place, and time.     Psychiatric:         Mood and Affect: Mood normal.         Behavior: Behavior normal.         Thought Content: Thought content normal.         Judgment: Judgment normal.                [1]   Past Medical History:  Diagnosis Date    H. pylori infection    [2]   Past Surgical History:  Procedure Laterality Date    TONSILLECTOMY AND ADENOIDECTOMY  2006    WISDOM TOOTH EXTRACTION     [3]   Family History  Problem Relation Name Age of Onset    No Known Problems Mother      No Known Problems " Father     [4]   Social History  Tobacco Use    Smoking status: Never    Smokeless tobacco: Never   Vaping Use    Vaping status: Never Used   Substance and Sexual Activity    Alcohol use: No    Drug use: No   [5]   Current Outpatient Medications on File Prior to Visit   Medication Sig    busPIRone (BUSPAR) 10 mg tablet Take 1 tablet (10 mg total) by mouth 2 (two) times a day (Patient not taking: Reported on 7/11/2025)    clotrimazole-betamethasone (LOTRISONE) 1-0.05 % cream Apply topically daily as needed (jock itch rash) (Patient not taking: Reported on 7/11/2025)    erythromycin (ILOTYCIN) ophthalmic ointment Administer 0.5 inches into the right eye daily at bedtime until resolved (Patient not taking: Reported on 7/11/2025)    esomeprazole (NexIUM) 20 mg capsule Take 20 mg by mouth every morning before breakfast (Patient not taking: Reported on 7/11/2025)    ketoconazole (NIZORAL) 2 % cream APPLY 2 GRAM TOPICALLY AT BEDTIME (Patient not taking: Reported on 7/11/2025)    sucralfate (CARAFATE) 1 g tablet Take 1 tablet (1 g total) by mouth 4 (four) times a day 30 min prior to meals and at bedtime (Patient not taking: Reported on 7/11/2025)    triamcinolone (KENALOG) 0.025 % cream Apply topically daily at bedtime as needed for irritation or rash (Patient not taking: Reported on 7/11/2025)    valACYclovir (Valtrex) 500 mg tablet Take 1 tablet (500 mg total) by mouth 3 (three) times a day as needed (cold sore outbreak) for up to 3 days